# Patient Record
Sex: MALE | Race: BLACK OR AFRICAN AMERICAN | NOT HISPANIC OR LATINO | Employment: UNEMPLOYED | URBAN - METROPOLITAN AREA
[De-identification: names, ages, dates, MRNs, and addresses within clinical notes are randomized per-mention and may not be internally consistent; named-entity substitution may affect disease eponyms.]

---

## 2017-11-02 ENCOUNTER — HOSPITAL ENCOUNTER (EMERGENCY)
Facility: OTHER | Age: 52
Discharge: HOME | End: 2017-11-02
Attending: EMERGENCY MEDICINE
Payer: MEDICAID

## 2017-11-02 VITALS
TEMPERATURE: 98 F | RESPIRATION RATE: 17 BRPM | WEIGHT: 180 LBS | SYSTOLIC BLOOD PRESSURE: 100 MMHG | BODY MASS INDEX: 28.25 KG/M2 | HEIGHT: 67 IN | DIASTOLIC BLOOD PRESSURE: 61 MMHG | OXYGEN SATURATION: 100 % | HEART RATE: 76 BPM

## 2017-11-02 DIAGNOSIS — M79.604 PAIN IN BOTH LOWER EXTREMITIES: ICD-10-CM

## 2017-11-02 DIAGNOSIS — M79.605 PAIN IN BOTH LOWER EXTREMITIES: ICD-10-CM

## 2017-11-02 DIAGNOSIS — R73.9 HYPERGLYCEMIA: Primary | ICD-10-CM

## 2017-11-02 DIAGNOSIS — I10 HTN (HYPERTENSION): ICD-10-CM

## 2017-11-02 LAB
ALBUMIN SERPL-MCNC: 3.3 G/DL (ref 3.3–5.5)
ALP SERPL-CCNC: 49 U/L (ref 42–141)
BILIRUB SERPL-MCNC: 1 MG/DL (ref 0.2–1.6)
BILIRUBIN, POC UA: NEGATIVE
BLOOD, POC UA: NEGATIVE
BUN SERPL-MCNC: 10 MG/DL (ref 7–22)
CALCIUM SERPL-MCNC: 8.7 MG/DL (ref 8–10.3)
CHLORIDE SERPL-SCNC: 102 MMOL/L (ref 98–108)
CLARITY, POC UA: CLEAR
COLOR, POC UA: YELLOW
CREAT SERPL-MCNC: 0.7 MG/DL (ref 0.6–1.2)
GLUCOSE SERPL-MCNC: 376 MG/DL (ref 73–118)
GLUCOSE, POC UA: ABNORMAL
HCO3 UR-SCNC: 23.5 MMOL/L (ref 24–28)
KETONES, POC UA: NEGATIVE
LDH SERPL L TO P-CCNC: 1.47 MMOL/L (ref 0.5–2.2)
LEUKOCYTE EST, POC UA: NEGATIVE
NITRITE, POC UA: NEGATIVE
PCO2 BLDA: 34.1 MMHG (ref 35–45)
PH SMN: 7.45 [PH] (ref 7.35–7.45)
PH UR STRIP: 5.5 [PH]
PO2 BLDA: 149 MMHG (ref 40–60)
POC ALT (SGPT): 23 U/L (ref 10–47)
POC AST (SGOT): 24 U/L (ref 11–38)
POC B-TYPE NATRIURETIC PEPTIDE: 181 PG/ML (ref 0–100)
POC BE: -1 MMOL/L
POC CARDIAC TROPONIN I: 0.02 NG/ML
POC SATURATED O2: 99 % (ref 95–100)
POC TCO2: 24 MMOL/L (ref 18–33)
POC TCO2: 25 MMOL/L (ref 24–29)
POCT GLUCOSE: 116 MG/DL (ref 70–110)
POCT GLUCOSE: 169 MG/DL (ref 70–110)
POCT GLUCOSE: 317 MG/DL (ref 70–110)
POCT GLUCOSE: 363 MG/DL (ref 70–110)
POCT GLUCOSE: 82 MG/DL (ref 70–110)
POTASSIUM BLD-SCNC: 3.4 MMOL/L (ref 3.6–5.1)
PROTEIN, POC UA: NEGATIVE
PROTEIN, POC: 6.1 G/DL (ref 6.4–8.1)
SAMPLE: ABNORMAL
SAMPLE: NORMAL
SODIUM BLD-SCNC: 135 MMOL/L (ref 128–145)
SPECIFIC GRAVITY, POC UA: 1.01
UROBILINOGEN, POC UA: 0.2 E.U./DL

## 2017-11-02 PROCEDURE — 85025 COMPLETE CBC W/AUTO DIFF WBC: CPT

## 2017-11-02 PROCEDURE — 96372 THER/PROPH/DIAG INJ SC/IM: CPT

## 2017-11-02 PROCEDURE — 80053 COMPREHEN METABOLIC PANEL: CPT

## 2017-11-02 PROCEDURE — 96374 THER/PROPH/DIAG INJ IV PUSH: CPT

## 2017-11-02 PROCEDURE — 25000003 PHARM REV CODE 250: Performed by: EMERGENCY MEDICINE

## 2017-11-02 PROCEDURE — 81003 URINALYSIS AUTO W/O SCOPE: CPT

## 2017-11-02 PROCEDURE — 96361 HYDRATE IV INFUSION ADD-ON: CPT

## 2017-11-02 PROCEDURE — 82803 BLOOD GASES ANY COMBINATION: CPT

## 2017-11-02 PROCEDURE — 99284 EMERGENCY DEPT VISIT MOD MDM: CPT | Mod: 25

## 2017-11-02 PROCEDURE — 83880 ASSAY OF NATRIURETIC PEPTIDE: CPT

## 2017-11-02 PROCEDURE — 63600175 PHARM REV CODE 636 W HCPCS: Performed by: EMERGENCY MEDICINE

## 2017-11-02 PROCEDURE — 84484 ASSAY OF TROPONIN QUANT: CPT

## 2017-11-02 RX ORDER — HYDROCODONE BITARTRATE AND ACETAMINOPHEN 7.5; 325 MG/1; MG/1
1 TABLET ORAL EVERY 6 HOURS PRN
COMMUNITY

## 2017-11-02 RX ORDER — LISINOPRIL 2.5 MG/1
2.5 TABLET ORAL DAILY
COMMUNITY

## 2017-11-02 RX ORDER — NAPROXEN 500 MG/1
500 TABLET ORAL 2 TIMES DAILY PRN
Qty: 20 TABLET | Refills: 0 | Status: SHIPPED | OUTPATIENT
Start: 2017-11-02 | End: 2017-12-25

## 2017-11-02 RX ORDER — GLIPIZIDE 10 MG/1
10 TABLET ORAL
COMMUNITY

## 2017-11-02 RX ORDER — METOPROLOL SUCCINATE 25 MG/1
25 TABLET, EXTENDED RELEASE ORAL DAILY
COMMUNITY

## 2017-11-02 RX ORDER — METFORMIN HYDROCHLORIDE 1000 MG/1
1000 TABLET ORAL 2 TIMES DAILY WITH MEALS
COMMUNITY

## 2017-11-02 RX ORDER — ASPIRIN 81 MG/1
81 TABLET ORAL DAILY
COMMUNITY

## 2017-11-02 RX ORDER — ATORVASTATIN CALCIUM 80 MG/1
80 TABLET, FILM COATED ORAL DAILY
COMMUNITY

## 2017-11-02 RX ORDER — POTASSIUM CHLORIDE 20 MEQ/1
40 TABLET, EXTENDED RELEASE ORAL
Status: COMPLETED | OUTPATIENT
Start: 2017-11-02 | End: 2017-11-02

## 2017-11-02 RX ORDER — ACETAMINOPHEN 325 MG/1
325 TABLET ORAL EVERY 6 HOURS PRN
COMMUNITY

## 2017-11-02 RX ORDER — CLOPIDOGREL BISULFATE 75 MG/1
75 TABLET ORAL DAILY
COMMUNITY

## 2017-11-02 RX ADMIN — INSULIN HUMAN 4 UNITS: 100 INJECTION, SOLUTION PARENTERAL at 02:11

## 2017-11-02 RX ADMIN — SODIUM CHLORIDE 1000 ML: 0.9 INJECTION, SOLUTION INTRAVENOUS at 01:11

## 2017-11-02 RX ADMIN — POTASSIUM CHLORIDE 40 MEQ: 20 TABLET, EXTENDED RELEASE ORAL at 01:11

## 2017-11-02 RX ADMIN — INSULIN HUMAN 8 UNITS: 100 INJECTION, SOLUTION PARENTERAL at 02:11

## 2017-11-02 RX ADMIN — SODIUM CHLORIDE 1000 ML: 0.9 INJECTION, SOLUTION INTRAVENOUS at 02:11

## 2017-11-02 NOTE — ED TRIAGE NOTES
"C/o numbness to toes x1 week. 3+ pedal pulses with cap refill < 3 secs, 5+ strength noted to BLE. Denies trauma/injury. PMH DM reports compliance with meds. cbg 363. States, "it's because I drank a diet coke".   "

## 2017-11-02 NOTE — ED NOTES
Pt diaphoretic/shaking. States that's what happens when his sugar gets too low. Pt given crackers and juice.

## 2017-11-02 NOTE — ED PROVIDER NOTES
"Encounter Date: 11/2/2017       History     Chief Complaint   Patient presents with    Leg Pain     "I'm having some pain in my lower legs and my toes have been going numb for the past week."      A 51-year-old male who presents to the ED with lower leg pain and numbness to toes which started a week ago.  Patient denies injury.  Patient has a history of CHF, DM and hypertension.  Patient states he got a cardiac stent placed in around September 19, 2017 at our [facility] in [place].  Patient also reports getting a cardiac stent last year at [facility].  Patient did not attend follow-up appointment at our [facility].  Patient states he moved back to [place].  Patient states he does not have a PCP he is trying to get reestablished with a physician in this area.  Patient states his blood sugars are well-controlled.  Patient admits to eating a doughnut this morning.  Patient denies chest pain, shortness of breath or leg swelling.          Review of patient's allergies indicates:  No Known Allergies  Past Medical History:   Diagnosis Date    Congestive heart failure     Diabetes      Past Surgical History:   Procedure Laterality Date    cardiac stents      left knee surgery        History reviewed. No pertinent family history.  Social History   Substance Use Topics    Smoking status: Current Every Day Smoker     Packs/day: 0.50     Types: Cigarettes    Smokeless tobacco: Never Used    Alcohol use No     Review of Systems   Constitutional: Negative for chills and fever.   HENT: Negative.  Negative for dental problem and nosebleeds.    Eyes: Negative.  Negative for discharge.   Respiratory: Negative.  Negative for chest tightness, shortness of breath and wheezing.    Cardiovascular: Negative.  Negative for chest pain, palpitations and leg swelling.   Gastrointestinal: Negative.  Negative for abdominal distention, abdominal pain, diarrhea and vomiting.   Endocrine: Negative for polydipsia, polyphagia and " polyuria.   Genitourinary: Negative.  Negative for difficulty urinating and dysuria.   Musculoskeletal: Negative for back pain and neck pain.        Bilateral leg pain   Skin: Negative.    Neurological: Positive for numbness.        Tingling to toes   Psychiatric/Behavioral: Negative.  Negative for agitation.   All other systems reviewed and are negative.      Physical Exam     Initial Vitals [11/02/17 1220]   BP Pulse Resp Temp SpO2   108/72 100 17 98 °F (36.7 °C) 99 %      MAP       84         Physical Exam    Nursing note and vitals reviewed.  Constitutional: Vital signs are normal. He appears well-developed. He is cooperative. No distress.   HENT:   Head: Normocephalic and atraumatic.   Right Ear: Tympanic membrane, external ear and ear canal normal.   Left Ear: Tympanic membrane, external ear and ear canal normal.   Nose: Nose normal.   Mouth/Throat: Uvula is midline and oropharynx is clear and moist.   Eyes: Conjunctivae and lids are normal. Pupils are equal, round, and reactive to light.   Neck: Normal range of motion. Neck supple.   Cardiovascular: Normal rate and regular rhythm.   Pulses:       Dorsalis pedis pulses are 2+ on the right side, and 2+ on the left side.   Pulmonary/Chest: Effort normal and breath sounds normal.   Abdominal: Soft. Normal appearance and bowel sounds are normal. He exhibits no distension. There is no CVA tenderness.   Musculoskeletal: Normal range of motion.   Extremities with no swelling or tenderness.  Decrease sensation to toes.  Distal pulses +2,  Capillary refill less than 2 seconds   Neurological: He is alert and oriented to person, place, and time. He has normal strength.   Skin: Skin is warm, dry and intact. Capillary refill takes less than 2 seconds.   Psychiatric: He has a normal mood and affect. His speech is normal and behavior is normal. Judgment and thought content normal. Cognition and memory are normal.         ED Course   Critical Care  Date/Time: 11/2/2017 7:08  PM  Performed by: DEANNE GONZALEZ  Authorized by: DEANNE GONZALEZ   Direct patient critical care time: 10 minutes  Additional history critical care time: 12 minutes  Ordering / reviewing critical care time: 10 minutes  Documentation critical care time: 10 minutes  Total critical care time (exclusive of procedural time) : 42 minutes  Critical care was necessary to treat or prevent imminent or life-threatening deterioration of the following conditions: dehydration and metabolic crisis.  Critical care was time spent personally by me on the following activities: examination of patient, evaluation of patient's response to treatment, ordering and performing treatments and interventions, ordering and review of laboratory studies, ordering and review of radiographic studies, pulse oximetry, re-evaluation of patient's condition and review of old charts.        Labs Reviewed   POCT URINALYSIS W/O SCOPE - Abnormal; Notable for the following:        Result Value    Glucose, UA 2+ (*)     Bilirubin, UA Negative (*)     Ketones, UA Negative (*)     Blood, UA Negative (*)     Protein, UA Negative (*)     Nitrite, UA Negative (*)     Leukocytes, UA Negative (*)     All other components within normal limits   POCT GLUCOSE - Abnormal; Notable for the following:     POCT Glucose 363 (*)     All other components within normal limits   ISTAT PROCEDURE - Abnormal; Notable for the following:     POC PCO2 34.1 (*)     POC PO2 149 (*)     POC HCO3 23.5 (*)     All other components within normal limits   POCT B-TYPE NATRIURETIC PEPTIDE (BNP) - Abnormal; Notable for the following:     POC B-Type Natriuretic Peptide 181 (*)     All other components within normal limits   POCT CMP - Abnormal; Notable for the following:     Albumin, POC 3.3 (*)     POC Glucose 376 (*)     POC Potassium 3.4 (*)     Protein 6.1 (*)     All other components within normal limits   TROPONIN ISTAT   POCT CBC   POCT GLUCOSE   POCT URINALYSIS W/O SCOPE   POCT GLUCOSE   POCT  CMP   POCT TROPONIN   POCT B-TYPE NATRIURETIC PEPTIDE (BNP)     CBC White blood cell count 7.3, hemoglobin 13, hematocrit 39, platelets 88 CMP sodium 135, potassium 3.4, glucose 376, bicarbonate 24, chloride 102, be UN 10 and creatinine 0.7 troponin 0.02.    UA glucose appreciated negative ketones, negative nitrites and negative leukocytes.          EKG Readings: (Independently Interpreted)   Initial Reading: No STEMI. Rhythm: Normal Sinus Rhythm. Heart Rate: 84. Axis: Left Axis Deviation. Clinical Impression: Normal Sinus Rhythm Other Impression: Abnormal EKG no ST elevation appreciated nonspecific S T wave changes.  QTC normal at 423.   Repeat EKG done at 5:16 shows normal sinus rhythm, ventricular rate of 75, QTC normal at 444 left axis.     I have reviewed the notes, assessments, and/or procedures performed by NP/PA, I concur with her/his documentation of Manoj Ceja.  Attending:   Physician Attestation Statement: I have reviewed this case with my non-physician provider.   Physician Attestation Statement: I have provided a face to face evaluation of this patient at the request of my non-physician provider.The patient's condition warranted physician involvement. Review of Lab and radiology Data - I personally reviewed the lab and radiology results. The treatment regimen was reviewed by me. The patient's risk factors required review.   Other Attend Additions:   Patient reports feeling after medication. Discussed labs, and imaging results. Answered questions and discussed discharge plan.   Patient is to return to the Emergency department if symptoms worsen or do not resolve.         Medical Decision Making:   Initial Assessment:   A 51-year-old male who presents to the ED with complaints of bilateral leg pain.  Patient reports numbness to toes which started a week ago.  Patient denies injury.  Patient recently got [facility] cardiac [facility] September 2017 [facility].  Patient  did not go to follow-up appointment.  Patient also reports getting a cardiac stent last year at [facility].  Patient denies chest pains or shortness of breath.  Patient has a history of DM, HTN, CAD, and CHF.  Glucose 363.  Patient denies polydipsia, polyuria or polyphagia.  Patient admits to taking oral  medications and insulin as prescribed.  Alert and oriented ×3, ENT normal, CV- RRR, Lungs-clear bilaterally. Abdomen is soft and non-tender.  Lower extremities with no swelling or tenderness, distal pulses +2. Decrease sensation to toes. Skin with no rashes.   Differential Diagnosis:   Hyperglycemia, DKA, Diabetic neuropathy  Clinical Tests:   Lab Tests: Ordered  ED Management:  Physical exam.  EKG. Glucose, CMP, CBC, Trop, BNP, ABG. NS 1 Liter.   Medicated with potassium chloride 40 mEq.  Patient was given 8 units regular insulin sq and 4 units IV. Repeat glucose 317. Pt given an additional NS bolus. Repeat glucose 116, 82. Pt was given orange juice. Last glucose 169. Pt referred to Internal Medicine and Cardiologist for follow-up. Pt instructed on eating a meal after being discharge. Pt verbalized understanding. Pt to follow-up with PCP in 1-2 days.                    ED Course      Clinical Impression:   The primary encounter diagnosis was Hyperglycemia. Diagnoses of HTN (hypertension) and Pain in both lower extremities were also pertinent to this visit.                           LAXMI Kendall  11/02/17 1822       Sandra Patton DO  11/02/17 6053

## 2017-12-25 ENCOUNTER — HOSPITAL ENCOUNTER (EMERGENCY)
Facility: OTHER | Age: 52
Discharge: HOME | End: 2017-12-25
Attending: EMERGENCY MEDICINE
Payer: MEDICAID

## 2017-12-25 VITALS
WEIGHT: 170 LBS | OXYGEN SATURATION: 96 % | RESPIRATION RATE: 16 BRPM | DIASTOLIC BLOOD PRESSURE: 56 MMHG | TEMPERATURE: 98 F | BODY MASS INDEX: 27.32 KG/M2 | HEART RATE: 96 BPM | HEIGHT: 66 IN | SYSTOLIC BLOOD PRESSURE: 108 MMHG

## 2017-12-25 DIAGNOSIS — S80.02XA CONTUSION OF LEFT KNEE, INITIAL ENCOUNTER: Primary | ICD-10-CM

## 2017-12-25 DIAGNOSIS — R52 PAIN: ICD-10-CM

## 2017-12-25 PROCEDURE — 25000003 PHARM REV CODE 250: Performed by: EMERGENCY MEDICINE

## 2017-12-25 PROCEDURE — 99283 EMERGENCY DEPT VISIT LOW MDM: CPT

## 2017-12-25 RX ORDER — ACETAMINOPHEN 500 MG
1000 TABLET ORAL
Status: COMPLETED | OUTPATIENT
Start: 2017-12-25 | End: 2017-12-25

## 2017-12-25 RX ORDER — IBUPROFEN 400 MG/1
800 TABLET ORAL
Status: COMPLETED | OUTPATIENT
Start: 2017-12-25 | End: 2017-12-25

## 2017-12-25 RX ADMIN — IBUPROFEN 800 MG: 400 TABLET, FILM COATED ORAL at 03:12

## 2017-12-25 RX ADMIN — ACETAMINOPHEN 1000 MG: 500 TABLET ORAL at 03:12

## 2017-12-25 NOTE — ED PROVIDER NOTES
"Encounter Date: 12/25/2017    SCRIBE #1 NOTE: I, Shari Jason , am scribing for, and in the presence of, Dr. Christian .       History     Chief Complaint   Patient presents with    Knee Pain     pt c/o 10/10 pain in the L knee after having some heavy boxes hit it yesterday     Time seen by provider: 2:50 AM    This is a 52 y.o. male who presents with complaint of left knee pain that has been constant since yesterday. Onset occurred after several heavy boxes fell onto the knee while the patient was at work. The patient has increased pain with walking, and states it feels as if "something is sticking." He denies taking using OTC medication for relief, and he did not experience relief after applying ice and a heating pad. Screws were placed in the left knee five years ago. He wanted to make sure there was no new injury. He denies numbness or weakness. He has no additional complaints.     Additional past medical, surgical, and social history as outlined in the nursing assessment was reviewed by me.         The history is provided by the patient.     Review of patient's allergies indicates:  No Known Allergies  Past Medical History:   Diagnosis Date    Congestive heart failure     Diabetes     High cholesterol      Past Surgical History:   Procedure Laterality Date    cardiac stents      left knee surgery        History reviewed. No pertinent family history.  Social History   Substance Use Topics    Smoking status: Current Every Day Smoker     Packs/day: 0.50     Types: Cigarettes    Smokeless tobacco: Never Used    Alcohol use No     Review of Systems   Constitutional: Negative for chills and fever.   Musculoskeletal: Positive for arthralgias (left knee).   Allergic/Immunologic: Negative for immunocompromised state.   Neurological: Negative for weakness and numbness.   Hematological: Does not bruise/bleed easily.   Psychiatric/Behavioral: Negative for confusion.       Physical Exam     Initial Vitals [12/25/17 " 0122]   BP Pulse Resp Temp SpO2   (!) 108/56 96 16 98.2 °F (36.8 °C) 96 %      MAP       73.33         Physical Exam    Nursing note and vitals reviewed.  Constitutional: He appears well-developed and well-nourished. He is not diaphoretic. No distress.   HENT:   Head: Normocephalic and atraumatic.   Right Ear: External ear normal.   Left Ear: External ear normal.   Eyes: EOM are normal. Pupils are equal, round, and reactive to light. Right eye exhibits no discharge. Left eye exhibits no discharge.   Neck: Normal range of motion.   Cardiovascular: Normal rate.   Pulmonary/Chest: No respiratory distress.   Musculoskeletal: Normal range of motion. He exhibits edema and tenderness.   Left knee: Effusion. Tenderness to the lateral aspect of the knee. Joint is stable.    Neurological: He is alert and oriented to person, place, and time. He has normal strength. No cranial nerve deficit.   Skin: Skin is warm and dry. Capillary refill takes less than 2 seconds. No rash noted. No erythema. No pallor.   Psychiatric: He has a normal mood and affect. His behavior is normal. Judgment and thought content normal.         ED Course   Procedures  Labs Reviewed - No data to display   Imaging Results          X-Ray Knee 3 View Left (Final result)  Result time 12/25/17 02:25:08    Final result by Cora Ruiz MD (12/25/17 02:25:08)                 Impression:      Patellar fracture repair changes with no acute osseous abnormality identified.      Electronically signed by: CORA RUIZ MD  Date:     12/25/17  Time:    02:25              Narrative:    Left knee 3 views AP, lateral, and sunrise view.    No evidence to suggest acute fracture or dislocation.  Postsurgical changes are visualized consistent with previous patellar fracture fixation.  Small ossific densities, suspected fragments seen at the superior and inferior aspects of the patella.  Mild degenerative changes are seen involving the medial tibiofemoral compartment.  No  significant suprapatellar joint effusion.                                      Medical Decision Making:   Initial Assessment:   Patient presents after knee trauma. X-ray obtained prior to my evaluation shows no acute injury. He is stable for outpatient management. Supportive care encouraged.   Clinical Tests:   Radiological Study: Ordered and Reviewed            Scribe Attestation:   Scribe #1: I performed the above scribed service and the documentation accurately describes the services I performed. I attest to the accuracy of the note.    Attending Attestation:           Physician Attestation for Scribe:  Physician Attestation Statement for Scribe #1: I, Dr. Christian , reviewed documentation, as scribed by Shari Jason  in my presence, and it is both accurate and complete.                 ED Course      Clinical Impression:     1. Contusion of left knee, initial encounter    2. Pain                                 Myla Christian MD  12/25/17 0567

## 2017-12-25 NOTE — ED TRIAGE NOTES
Patient presents to ED with c/o L knee pain he reports chronic pain in his L knee, and that 2 days ago he dropped boxes on his knee.

## 2018-01-15 ENCOUNTER — HOSPITAL ENCOUNTER (EMERGENCY)
Facility: HOSPITAL | Age: 53
Discharge: HOME | End: 2018-01-15
Attending: EMERGENCY MEDICINE
Payer: MEDICAID

## 2018-01-15 VITALS
HEART RATE: 84 BPM | RESPIRATION RATE: 18 BRPM | SYSTOLIC BLOOD PRESSURE: 115 MMHG | BODY MASS INDEX: 27.32 KG/M2 | HEIGHT: 66 IN | TEMPERATURE: 98 F | DIASTOLIC BLOOD PRESSURE: 58 MMHG | WEIGHT: 170 LBS | OXYGEN SATURATION: 98 %

## 2018-01-15 DIAGNOSIS — Z86.79 HISTORY OF HYPERTENSION: ICD-10-CM

## 2018-01-15 DIAGNOSIS — H53.8 BLURRY VISION, BILATERAL: Primary | ICD-10-CM

## 2018-01-15 DIAGNOSIS — Z86.39 HISTORY OF DIABETES MELLITUS: ICD-10-CM

## 2018-01-15 LAB
ANION GAP SERPL CALC-SCNC: 5 MMOL/L
BUN SERPL-MCNC: 18 MG/DL
CALCIUM SERPL-MCNC: 8.9 MG/DL
CHLORIDE SERPL-SCNC: 104 MMOL/L
CO2 SERPL-SCNC: 26 MMOL/L
CREAT SERPL-MCNC: 0.8 MG/DL
ERYTHROCYTE [SEDIMENTATION RATE] IN BLOOD BY WESTERGREN METHOD: 10 MM/HR
EST. GFR  (AFRICAN AMERICAN): >60 ML/MIN/1.73 M^2
EST. GFR  (NON AFRICAN AMERICAN): >60 ML/MIN/1.73 M^2
GLUCOSE SERPL-MCNC: 269 MG/DL
POTASSIUM SERPL-SCNC: 4.1 MMOL/L
SODIUM SERPL-SCNC: 135 MMOL/L

## 2018-01-15 PROCEDURE — 85651 RBC SED RATE NONAUTOMATED: CPT

## 2018-01-15 PROCEDURE — 99283 EMERGENCY DEPT VISIT LOW MDM: CPT

## 2018-01-15 PROCEDURE — 80048 BASIC METABOLIC PNL TOTAL CA: CPT

## 2018-01-15 NOTE — ED PROVIDER NOTES
"Encounter Date: 1/15/2018       History     Chief Complaint   Patient presents with    Blurred Vision     The patient presents from work with a completely blurry vision.  This began at 8:00 and lasted for about 30 minutes.  He adamantly denies that there is any type double vision.  Says he has a history of hypertension as well as diabetes and coronary artery disease.  Says he is compliant with his medications.  He also says that he would like to talk with the  regarding getting on disability because of his multiple health issues who says "a man in my condition should not work"    .       The history is provided by the patient. No  was used.   Neurologic Problem   The primary symptoms include visual change. Primary symptoms do not include headaches or dizziness. Illness onset: 0800. The episode lasted 30 minutes. The symptoms are improving.   The visual change began today. The visual change has been rapidly improving since its onset. The visual change includes blurred vision. The visual change does not include double vision, photophobia, decreased vision or loss of vision.   Additional symptoms do not include weakness, lower back pain, leg pain, photophobia, nystagmus, taste disturbance, tinnitus or vertigo. Medical issues also include diabetes and hypertension.     Review of patient's allergies indicates:  No Known Allergies  Past Medical History:   Diagnosis Date    Congestive heart failure     Diabetes     High cholesterol      Past Surgical History:   Procedure Laterality Date    cardiac stents      left knee surgery        No family history on file.  Social History   Substance Use Topics    Smoking status: Current Every Day Smoker     Packs/day: 0.50     Types: Cigarettes    Smokeless tobacco: Never Used    Alcohol use No     Review of Systems   Constitutional: Negative for appetite change.   HENT: Negative for congestion, dental problem, ear pain and tinnitus.    Eyes: " Positive for blurred vision and visual disturbance. Negative for double vision, photophobia, pain, redness and itching.   Respiratory: Negative for chest tightness and shortness of breath.    Cardiovascular: Negative for chest pain.   Gastrointestinal: Negative for abdominal distention and abdominal pain.   Musculoskeletal: Positive for arthralgias.        Left knee pain/arthritis   Allergic/Immunologic: Negative for immunocompromised state.   Neurological: Negative for dizziness, vertigo, facial asymmetry, weakness, light-headedness, numbness and headaches.   Hematological: Does not bruise/bleed easily.   Psychiatric/Behavioral: Negative for behavioral problems.       Physical Exam     Initial Vitals [01/15/18 0859]   BP Pulse Resp Temp SpO2   (!) 115/58 88 18 98.3 °F (36.8 °C) 100 %      MAP       77         Physical Exam    Constitutional: He appears well-developed and well-nourished. No distress.   HENT:   Head: Normocephalic.   Right Ear: External ear normal.   Left Ear: External ear normal.   Nose: Nose normal.   Eyes: Pupils are equal, round, and reactive to light. Right eye exhibits no discharge. Left eye exhibits no discharge.   fundi unremarkable   Neck: Normal range of motion.   Pulmonary/Chest: Breath sounds normal.   Abdominal: Soft.   Musculoskeletal: Normal range of motion.   Neurological: He is alert and oriented to person, place, and time. He displays normal reflexes. No cranial nerve deficit or sensory deficit.   Skin: Skin is warm and dry. Capillary refill takes less than 2 seconds.   Psychiatric: He has a normal mood and affect.         ED Course  Patient seen and examined. NIHSS =0. DDX includes stroke, glaucoma, TIA, poor vision. He has no double vision. No real tenderness over the temporal artery. He wants me to put him on disbaility and I told him I cannot do that. He then requested to see the . Says he's compliant on meds.     1140: I went over the diagnostic.  The patient.  At this point  he will follow with a physician on [facility] on magazine.     Procedures  Labs Reviewed   BASIC METABOLIC PANEL   SEDIMENTATION RATE, MANUAL                               ED Course      Clinical Impression   The primary encounter diagnosis was Blurry vision, bilateral. Diagnoses of History of diabetes mellitus and History of hypertension were also pertinent to this visit.                           Eligio Neil,   01/15/18 1141

## 2018-01-15 NOTE — ED NOTES
HEENT: Patient c/o blurred vision.   LOC: The patient is awake, alert and aware of environment with an appropriate affect, the patient is oriented x 3 and speaking appropriately.  APPEARANCE: Patient resting comfortably and in no acute distress, patient is clean and well groomed, patient's clothing is properly fastened.  SKIN: The skin is warm and dry, color consistent with ethnicity, patient has normal skin turgor and moist mucus membranes, skin intact, no breakdown or bruising noted.  MUSCULOSKELETAL: Patient moving all extremities well, no obvious swelling or deformities noted.  RESPIRATORY: Airway is open and patent, respirations are spontaneous, patient has a normal effort and rate, no accessory muscle use noted.  CARDIAC: Patient has a normal rate and rhythm, no periphreal edema noted, capillary refill < 3 seconds.  ABDOMEN: Soft and non tender to palpation, no distention noted.  NEUROLOGIC: eyes open spontaneously, behavior appropriate to situation, follows commands, facial expression symmetrical, bilateral hand grasp equal and even, purposeful motor response noted, normal sensation in all extremities when touched with a finger.

## 2018-01-15 NOTE — ED TRIAGE NOTES
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  Patient denies dizziness.  Patient states he is a diabetic.